# Patient Record
Sex: MALE | URBAN - METROPOLITAN AREA
[De-identification: names, ages, dates, MRNs, and addresses within clinical notes are randomized per-mention and may not be internally consistent; named-entity substitution may affect disease eponyms.]

---

## 2017-01-31 ENCOUNTER — THERAPY VISIT (OUTPATIENT)
Dept: PHYSICAL THERAPY | Facility: CLINIC | Age: 62
End: 2017-01-31
Payer: COMMERCIAL

## 2017-01-31 DIAGNOSIS — M25.561 ACUTE PAIN OF RIGHT KNEE: Primary | ICD-10-CM

## 2017-01-31 PROCEDURE — 97110 THERAPEUTIC EXERCISES: CPT | Mod: GP | Performed by: PHYSICAL THERAPIST

## 2017-01-31 PROCEDURE — 97161 PT EVAL LOW COMPLEX 20 MIN: CPT | Mod: GP | Performed by: PHYSICAL THERAPIST

## 2017-01-31 ASSESSMENT — ACTIVITIES OF DAILY LIVING (ADL)
HOW_WOULD_YOU_RATE_THE_OVERALL_FUNCTION_OF_YOUR_KNEE_DURING_YOUR_USUAL_DAILY_ACTIVITIES?: NEARLY NORMAL
STIFFNESS: THE SYMPTOM AFFECTS MY ACTIVITY MODERATELY
WEAKNESS: THE SYMPTOM AFFECTS MY ACTIVITY MODERATELY
GIVING WAY, BUCKLING OR SHIFTING OF KNEE: THE SYMPTOM AFFECTS MY ACTIVITY SLIGHTLY
GO DOWN STAIRS: ACTIVITY IS NOT DIFFICULT
SIT WITH YOUR KNEE BENT: ACTIVITY IS MINIMALLY DIFFICULT
SQUAT: ACTIVITY IS NOT DIFFICULT
RAW_SCORE: 51
WALK: ACTIVITY IS NOT DIFFICULT
GO UP STAIRS: ACTIVITY IS NOT DIFFICULT
SWELLING: I HAVE THE SYMPTOM BUT IT DOES NOT AFFECT MY ACTIVITY
STAND: ACTIVITY IS MINIMALLY DIFFICULT
KNEE_ACTIVITY_OF_DAILY_LIVING_SCORE: 72.86
AS_A_RESULT_OF_YOUR_KNEE_INJURY,_HOW_WOULD_YOU_RATE_YOUR_CURRENT_LEVEL_OF_DAILY_ACTIVITY?: NEARLY NORMAL
LIMPING: THE SYMPTOM AFFECTS MY ACTIVITY MODERATELY
PAIN: THE SYMPTOM AFFECTS MY ACTIVITY MODERATELY
RISE FROM A CHAIR: ACTIVITY IS NOT DIFFICULT
KNEE_ACTIVITY_OF_DAILY_LIVING_SUM: 51
KNEEL ON THE FRONT OF YOUR KNEE: ACTIVITY IS SOMEWHAT DIFFICULT

## 2017-01-31 NOTE — PROGRESS NOTES
[facility] Initial Evaluation    Subjective:    Forest Kapadia is a 61 year old male with a right knee condition.  Condition occurred with:  Insidious onset.  Condition occurred: for unknown reasons.  This is a new condition  Patient notes R knee pain since about 1/10/17.  He denies specific cause.  Had MRI that revealed PF arthritis.  He was referred to PT for this..    Site of Pain: anterior joint line (tibio-femoral)    Pain is described as aching and is intermittent and reported as 7/10.  Associated symptoms:  Loss of strength, buckling/giving out, edema and loss of motion/stiffness. Worse during: right away in morning, then with use/weight bearing.  Symptoms are exacerbated by walking, ascending stairs, descending stairs, kneeling and sitting and relieved by NSAID's.  Since onset symptoms are gradually improving.  Special tests:  MRI.  Previous treatment: none.    General health as reported by patient is excellent.  Pertinent medical history includes:  None.  Medical allergies: no.  Other surgeries include:  None reported.  Current medications:  Anti-inflammatory.  Current occupation is None.        Barriers include:  None as reported by the patient.    Red flags:  None as reported by the patient.                      Objective:  KNEE:    PROM:   L  R   Hyperextension 7 6   Extension 0 0   Flexion 126 124 end range pain (posterior)     Strength:   L R   HIP     Flex 5 5   Ext 4+ 4+   Abd 4+ 4+   KNEE     Flex 5 5   Ext 5 5       Special tests:   L R   Anterior Drawer  negative   Posterior Drawer  negative   Lachman's  negative   Valgus 0 degrees  negative   Valgus 30 degrees  negative   Varus 0 degrees  negative   Varus 30 degrees  negative   Ambrosio's  negative   Appley's  negative   Lateral Compression     Patellar Compression         Palpation: minimal edema noted anterior joint line area    Patellar tracking: mild lateral glide/tilt, but fairly descend position and mobility    Gait:  mild antalgic limp noted    System    Physical Exam    General     ROS    Assessment/Plan:      Patient is a 61 year old male with right side knee complaints.    Patient has the following significant findings with corresponding treatment plan.                Diagnosis 1:  R knee pain   (MRI with PF arthritis, on exam, swelling in tibiofemoral joint area, tenderness medial hamstring at musculotendinous junction and posterior/medial pain at end range knee flexion)    Pain -  hot/cold therapy  Decreased ROM/flexibility - manual therapy and therapeutic exercise  Decreased strength - therapeutic exercise and therapeutic activities  Impaired balance - neuro re-education and therapeutic activities  Decreased proprioception - neuro re-education and therapeutic activities  Edema - cold therapy  Impaired gait - gait training  Decreased function - therapeutic activities    Therapy Evaluation Codes:   1) History comprised of:   Personal factors that impact the plan of care:      None.    Comorbidity factors that impact the plan of care are:      None.     Medications impacting care: Anti-inflammatory.  2) Examination of Body Systems comprised of:   Body structures and functions that impact the plan of care:      Knee.   Activity limitations that impact the plan of care are:      Sitting, Squatting/kneeling, Stairs, Standing and Walking.  3) Clinical presentation characteristics are:   Stable/Uncomplicated.  4) Decision-Making    Low complexity using standardized patient assessment instrument and/or measureable assessment of functional outcome.  Cumulative Therapy Evaluation is: Low complexity.    Previous and current functional limitations:  (See Goal Flow Sheet for this information)    Short term and Long term goals: (See Goal Flow Sheet for this information)     Communication ability:  Patient appears to be able to clearly communicate and understand verbal and written communication and follow directions correctly.  Treatment  Explanation - The following has been discussed with the patient:   RX ordered/plan of care  Anticipated outcomes  Possible risks and side effects  This patient would benefit from PT intervention to resume normal activities.   Rehab potential is good.    Frequency:  2 X week, once daily  Duration:  for 1 weeks tapering to 1 X a week over 4 weeks  Discharge Plan:  Achieve all LTG.  Independent in home treatment program.  Reach maximal therapeutic benefit.    Please refer to the daily flowsheet for treatment today, total treatment time and time spent performing 1:1 timed codes.

## 2017-01-31 NOTE — Clinical Note
[facility] PHYSICAL THERAPY  [address]  [place] [address]  [phone]    2017    Re: Forest Kapadia   :   1955  MRN:  [id number]   [facility]ING PHYSICIAN:   [facility]    Date of Initial Evaluation:  2017  Visits:  Rxs Used: 1  Reason for Referral:  Acute pain of right knee    EVALUATION SUMMARY    Subjective:  Forest Kapadia is a 61 year old male with a right knee condition.  Condition occurred with:  Insidious onset.  Condition occurred: for unknown reasons.  This is a new condition  Patient notes R knee pain since about 1/10/17.  He denies specific cause.  Had MRI that revealed PF arthritis.  He was referred to PT for this..    Site of Pain: anterior joint line (tibio-femoral)    Pain is described as aching and is intermittent and reported as 7/10.  Associated symptoms:  Loss of strength, buckling/giving out, edema and loss of motion/stiffness. Worse during: right away in morning, then with use/weight bearing.  Symptoms are exacerbated by walking, ascending stairs, descending stairs, kneeling and sitting and relieved by NSAID's.  Since onset symptoms are gradually improving.  Special tests:  MRI.  Previous treatment: none.    General health as reported by patient is excellent.  Pertinent medical history includes:  None.  Medical allergies: no.  Other surgeries include:  None reported.  Current medications:  Anti-inflammatory.  Current occupation is None.      Barriers include:  None as reported by the patient.  Red flags:  None as reported by the patient.                 Objective:    KNEE:  PROM:   L  R   Hyperextension 7 6   Extension 0 0   Flexion 126 124 end range pain (posterior)     Strength:   L R   HIP     Flex 5 5   Ext 4+ 4+   Abd 4+ 4+   KNEE     Flex 5 5   Ext 5 5     Special tests:   L R   Anterior Drawer  negative    Posterior Drawer  negative   Lachman's  negative   Valgus 0 degrees  negative   Valgus 30 degrees  negative   Varus 0 degrees  negative   Varus 30 degrees  negative   Ambrosio's  negative   Appley's  negative   Lateral Compression     Patellar Compression       Palpation: minimal edema noted anterior joint line area  Patellar tracking: mild lateral glide/tilt, but fairly descend position and mobility  Gait: mild antalgic limp noted    Assessment/Plan:    Patient is a 61 year old male with right side knee complaints.    Patient has the following significant findings with corresponding treatment plan.                Diagnosis 1:  R knee pain   (MRI with PF arthritis, on exam, swelling in tibiofemoral joint area, tenderness medial hamstring at musculotendinous junction and posterior/medial pain at end range knee flexion)  Pain -  hot/cold therapy  Decreased ROM/flexibility - manual therapy and therapeutic exercise  Decreased strength - therapeutic exercise and therapeutic activities  Impaired balance - neuro re-education and therapeutic activities  Decreased proprioception - neuro re-education and therapeutic activities  Edema - cold therapy  Impaired gait - gait training  Decreased function - therapeutic activities    Therapy Evaluation Codes:   1) History comprised of:   Personal factors that impact the plan of care:      None.    Comorbidity factors that impact the plan of care are:      None.     Medications impacting care: Anti-inflammatory.  2) Examination of Body Systems comprised of:  Re: Forest Kapadia   :   1955     Body structures and functions that impact the plan of care:      Knee.   Activity limitations that impact the plan of care are:      Sitting, Squatting/kneeling, Stairs, Standing and Walking.  3) Clinical presentation characteristics are:   Stable/Uncomplicated.  4) Decision-Making    Low complexity using standardized patient assessment instrument and/or measureable assessment of  functional outcome.  Cumulative Therapy Evaluation is: Low complexity.    Previous and current functional limitations:  (See Goal Flow Sheet for this information)    Short term and Long term goals: (See Goal Flow Sheet for this information)     Communication ability:  Patient appears to be able to clearly communicate and understand verbal and written communication and follow directions correctly.  Treatment Explanation - The following has been discussed with the patient:   RX ordered/plan of care  Anticipated outcomes  Possible risks and side effects  This patient would benefit from PT intervention to resume normal activities.   Rehab potential is good.    Frequency:  2 X week, once daily  Duration:  for 1 weeks tapering to 1 X a week over 4 weeks  Discharge Plan:  Achieve all LTG.  Independent in home treatment program.  Reach maximal therapeutic benefit.     Thank you for your referral.    INQUIRIES  Therapist: Serge Odonnell DPT  [facility]  [address] [address]  Phone: [phone]  Fax: [phone]

## 2017-02-02 ENCOUNTER — THERAPY VISIT (OUTPATIENT)
Dept: PHYSICAL THERAPY | Facility: CLINIC | Age: 62
End: 2017-02-02

## 2017-02-02 DIAGNOSIS — M25.561 ACUTE PAIN OF RIGHT KNEE: Primary | ICD-10-CM

## 2017-02-02 PROCEDURE — 97110 THERAPEUTIC EXERCISES: CPT | Mod: GP | Performed by: PHYSICAL THERAPIST

## 2017-02-02 PROCEDURE — 97014 ELECTRIC STIMULATION THERAPY: CPT | Mod: GP | Performed by: PHYSICAL THERAPIST

## 2017-05-01 PROBLEM — M25.561 ACUTE PAIN OF RIGHT KNEE: Status: RESOLVED | Noted: 2017-01-31 | Resolved: 2017-05-01

## 2017-05-01 NOTE — PROGRESS NOTES
Subjective:    HPI                    Objective:    System    Physical Exam    General     ROS    Assessment/Plan:      DISCHARGE REPORT    Progress reporting period is from 1/31/17 to 2/2/17 (2 visits).       SUBJECTIVE  Subjective changes noted by patient:   Patient reports feeling well since last time, still some increased pain with walking when bearing weight on R leg. Says HEP going well, notes pain with hamstring stretch in posterior R knee/ distal hamstring tendon area. Says that pain feels more dull than last visit.    Current pain level is: 5/10.     Previous pain level was: 7/10.   Changes in function:  Yes (See Goal flowsheet attached for changes in current functional level)  Adverse reaction to treatment or activity: None    OBJECTIVE  Changes noted in objective findings:  Yes,   Objective: Minimal edema around R knee joint line, slightly improved since last visit. Noted some fatigue with biking 3 min, seated knee ext. AROM with DF pt was able to perform 8 out of 10 reps before fatigue. Also noted some trunk sidebending with side stepping exercise, corrections for smaller steps, more core control.     ASSESSMENT/PLAN  Updated problem list and treatment plan: Diagnosis 1:  R knee pain (MRI with PF pain, but exam with edema tibiofemoral joint, tender posteromedial HS musculotendinous junction, pain with end range flexion)    Progress toward STG/LTGs have been made:  See Goal flow sheet completed today.  Assessment of Progress: Patient did not return to therapy. Current status is unknown.  Self Management Plans:  Patient has been instructed in a home treatment program.  Patient continues to require the following intervention to meet STG and LT's:  Patient did not return to therapy.  PT services will be discontinued.     Recommendations:  Patient will be discharged from physical therapy.         Please refer to the daily flowsheet for treatment today, total treatment time and time spent performing 1:1 timed  codes.

## 2019-08-07 ENCOUNTER — THERAPY VISIT (OUTPATIENT)
Dept: PHYSICAL THERAPY | Facility: CLINIC | Age: 64
End: 2019-08-07
Payer: COMMERCIAL

## 2019-08-07 DIAGNOSIS — M25.562 ACUTE PAIN OF LEFT KNEE: ICD-10-CM

## 2019-08-07 PROCEDURE — 97110 THERAPEUTIC EXERCISES: CPT | Mod: GP | Performed by: PHYSICAL THERAPIST

## 2019-08-07 PROCEDURE — 97161 PT EVAL LOW COMPLEX 20 MIN: CPT | Mod: GP | Performed by: PHYSICAL THERAPIST

## 2019-08-07 PROCEDURE — 97140 MANUAL THERAPY 1/> REGIONS: CPT | Mod: GP | Performed by: PHYSICAL THERAPIST

## 2019-08-07 ASSESSMENT — ACTIVITIES OF DAILY LIVING (ADL)
LIMPING: I HAVE THE SYMPTOM BUT IT DOES NOT AFFECT MY ACTIVITY
WALK: ACTIVITY IS NOT DIFFICULT
STAND: ACTIVITY IS NOT DIFFICULT
RAW_SCORE: 65
SWELLING: I HAVE THE SYMPTOM BUT IT DOES NOT AFFECT MY ACTIVITY
SQUAT: ACTIVITY IS NOT DIFFICULT
KNEEL ON THE FRONT OF YOUR KNEE: ACTIVITY IS NOT DIFFICULT
SIT WITH YOUR KNEE BENT: ACTIVITY IS NOT DIFFICULT
PAIN: I HAVE THE SYMPTOM BUT IT DOES NOT AFFECT MY ACTIVITY
WEAKNESS: I DO NOT HAVE THE SYMPTOM
GO DOWN STAIRS: ACTIVITY IS NOT DIFFICULT
HOW_WOULD_YOU_RATE_THE_OVERALL_FUNCTION_OF_YOUR_KNEE_DURING_YOUR_USUAL_DAILY_ACTIVITIES?: NORMAL
GIVING WAY, BUCKLING OR SHIFTING OF KNEE: I HAVE THE SYMPTOM BUT IT DOES NOT AFFECT MY ACTIVITY
GO UP STAIRS: ACTIVITY IS NOT DIFFICULT
HOW_WOULD_YOU_RATE_THE_CURRENT_FUNCTION_OF_YOUR_KNEE_DURING_YOUR_USUAL_DAILY_ACTIVITIES_ON_A_SCALE_FROM_0_TO_100_WITH_100_BEING_YOUR_LEVEL_OF_KNEE_FUNCTION_PRIOR_TO_YOUR_INJURY_AND_0_BEING_THE_INABILITY_TO_PERFORM_ANY_OF_YOUR_USUAL_DAILY_ACTIVITIES?: 90
RISE FROM A CHAIR: ACTIVITY IS NOT DIFFICULT
AS_A_RESULT_OF_YOUR_KNEE_INJURY,_HOW_WOULD_YOU_RATE_YOUR_CURRENT_LEVEL_OF_DAILY_ACTIVITY?: NORMAL
STIFFNESS: I HAVE THE SYMPTOM BUT IT DOES NOT AFFECT MY ACTIVITY
KNEE_ACTIVITY_OF_DAILY_LIVING_SCORE: 92.86
KNEE_ACTIVITY_OF_DAILY_LIVING_SUM: 65

## 2019-08-07 NOTE — LETTER
[facility] ATHLETIC [facility] PHYSICAL THERAPY  [address]  [place] [address]  [phone]    2019    Re: Forest Kapadia   :   1955  MRN:  [id number]   REFERRING PHYSICIAN:   Tami Mei    [facility]    Date of Initial Evaluation:  2019  Visits:  Rxs Used: 1  Reason for Referral:  Acute pain of left knee    EVALUATION SUMMARY    Subjective:  The history is provided by the patient. No  was used.   Type of problem:  Left knee  Condition occurred with:  Insidious onset. This is a new condition   Problem details: I woke up with the pain 2019 morning.  I was standing and walking on , but nothing unusual..   Patient reports pain:  Anterior, lateral and medial. Radiates to:  Lower leg. Associated with: none. Symptoms are exacerbated by descending stairs, kneeling, weight bearing and ascending stairs (trying to bend the knee) and relieved by NSAID's.  Forest Kapadia being seen for left knee pain.   Problem began 2019. Where condition occurred: for unknown reasons.Problem occurred: unknown  and reported as 7/10 (lowest has been a 2.) on pain scale. General health as reported by patient is excellent.  Other medical history details: right knee pain.  Surgeries include:  None.  Current medications:  Pain medication.   Primary job tasks include:  Prolonged standing.  Pain is described as sharp and is constant. Pain is worse in the P.M.. Since onset symptoms are gradually improving. Special tests:  X-ray (unknown). Previous treatment includes physical therapy (right knee pain ). There was moderate improvement following previous treatment.   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  Barriers include:  Bathroom/bedroom on second floor (three level house).  Red flags:  None as reported by patient.                   Objective:  Standing Alignment:    Cervical/Thoracic:  Forward head (poor sitting posture)  Lumbar:  Lordosis decr  Gait:    Deviations:  Knee:  Knee extension decr LGeneral Deviations:  Stance time decr and araceli decr  Non-Weight Bearing:    Knee:  Patellar lateral tilt L  Flexibility/Screens:   Positive screens:  Knee  Re: Forest Kapadia   :   1955    Lower Extremity:  Decreased left lower extremity flexibility:IT Band; Quadriceps; Hamstrings and Gastroc  Decreased right lower extremity flexibility:  IT Band; Quadriceps; Hamstrings and Gastroc       Knee Evaluation:  ROM:    AROM  Extension:  Left: 10    Right:  8  Flexion: Left: 110    Right: 120  PROM  Hyperextension: Left: 6    Right:  3  Extension: Left: 0    Right:  0  Flexion: Left: 120    Right:  122  Endfeel: hip abduction strength right 3+/5, left 3/5, no hip extension passive bilateral   Strength:   Extension:  Left: 5/5   Pain:      Right: 5/5   Pain:  Flexion:  Left: 5/5   Pain:      Right: 5/5   Pain:    Quad Set Left: Fair    Pain:   Quad Set Right: Good    Pain:  Ligament Testing:    Varus 0:  Left:  Neg     Valgus 0:  Left:  Neg    Anterior Drawer:  Left:  Neg      Special Tests:   Left knee positive for the following special tests:  Patellar Tracking-Abduction Lateral  Palpation:    Left knee tenderness present at:  Lateral Joint Line; IT Band and Patellar Lateral  Mobility Testing:    Patellofemoral Medial:  Left: hypomobile      Patellofemoral Lateral:  Left: normal      Patellofemoral Superior:  Left: normal      Patellofemoral Inferior:  Left: normal      Functional Testing:    Quad:    Single Leg Squat:  Left:      Right:        Bilateral Leg Squat:   Moderate loss of control    Proprioception:   Stork Balance Test:  Left:  7  Right:  WFL  % of Uninvolved:     Assessment/Plan:    Patient is a 63 year old male with left side knee complaints.    Patient has the following significant findings with corresponding  treatment plan.                Diagnosis 1:  Left knee pain   Pain -  manual therapy, self management, education and home program  Decreased ROM/flexibility - manual therapy, therapeutic exercise, therapeutic activity and home program  Decreased joint mobility - manual therapy, therapeutic exercise, therapeutic activity and home program  Re: Forest Kapadia   :   1955    Decreased strength - therapeutic exercise, therapeutic activities and home program  Impaired balance - neuro re-education, gait training, therapeutic activities and home program  Impaired gait - gait training and home program  Impaired muscle performance - neuro re-education and home program  Decreased function - therapeutic activities and home program  Impaired posture - neuro re-education, therapeutic activities and home program    Therapy Evaluation Codes:   Cumulative Therapy Evaluation is: Low complexity.    Previous and current functional limitations:  (See Goal Flow Sheet for this information)    Short term and Long term goals: (See Goal Flow Sheet for this information)     Communication ability:  Patient appears to be able to clearly communicate and understand verbal and written communication and follow directions correctly.  Treatment Explanation - The following has been discussed with the patient:   RX ordered/plan of care  This patient would benefit from PT intervention to resume normal activities.   Rehab potential is good.    Frequency:  1 X week, once daily  Duration:  for 6 weeks  Discharge Plan:  Achieve all LTG.  Independent in home treatment program.    Thank you for your referral.    INQUIRIES  Therapist:Juliette De La Rosa, PT  [facility]  [address] [address]  Phone: [phone]  Fax: [phone]

## 2019-08-07 NOTE — PROGRESS NOTES
[facility] Initial Evaluation  Subjective:  The history is provided by the patient. No  was used.   Type of problem:  Left knee   Condition occurred with:  Insidious onset. This is a new condition   Problem details: I woke up with the pain Monday 8/5/2019 morning.  I was standing and walking on Sunday, but nothing unusual..   Patient reports pain:  Anterior, lateral and medial. Radiates to:  Lower leg. Associated with: none. Symptoms are exacerbated by descending stairs, kneeling, weight bearing and ascending stairs (trying to bend the knee) and relieved by NSAID's.    Forest Kapadia being seen for left knee pain.   Problem began 8/5/2019. Where condition occurred: for unknown reasons.Problem occurred: unknown  and reported as 7/10 (lowest has been a 2.) on pain scale. General health as reported by patient is excellent.  Other medical history details: right knee pain.    Surgeries include:  None.  Current medications:  Pain medication.   Primary job tasks include:  Prolonged standing.  Pain is described as sharp and is constant. Pain is worse in the P.M.. Since onset symptoms are gradually improving. Special tests:  X-ray (unknown). Previous treatment includes physical therapy (right knee pain ). There was moderate improvement following previous treatment.   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    Barriers include:  Bathroom/bedroom on second floor (three level house).  Red flags:  None as reported by patient.                      Objective:  Standing Alignment:    Cervical/Thoracic:  Forward head (poor sitting posture)    Lumbar:  Lordosis decr            Gait:      Deviations:  Knee:  Knee extension decr LGeneral Deviations:  Stance time decr and araceli decr  Non-Weight Bearing:        Knee:  Patellar lateral tilt L    Flexibility/Screens:   Positive screens:  Knee    Lower Extremity:  Decreased left lower  extremity flexibility:IT Band; Quadriceps; Hamstrings and Gastroc    Decreased right lower extremity flexibility:  IT Band; Quadriceps; Hamstrings and Gastroc                                                      Knee Evaluation:  ROM:    AROM      Extension:  Left: 10    Right:  8  Flexion: Left: 110    Right: 120  PROM    Hyperextension: Left: 6    Right:  3  Extension: Left: 0    Right:  0  Flexion: Left: 120    Right:  122    Endfeel: hip abduction strength right 3+/5, left 3/5, no hip extension passive bilateral   Strength:     Extension:  Left: 5/5   Pain:      Right: 5/5   Pain:  Flexion:  Left: 5/5   Pain:      Right: 5/5   Pain:    Quad Set Left: Fair    Pain:   Quad Set Right: Good    Pain:  Ligament Testing:    Varus 0:  Left:  Neg       Valgus 0:  Left:  Neg      Anterior Drawer:  Left:  Neg          Special Tests:   Left knee positive for the following special tests:  Patellar Tracking-Abduction Lateral    Palpation:    Left knee tenderness present at:  Lateral Joint Line; IT Band and Patellar Lateral      Mobility Testing:      Patellofemoral Medial:  Left: hypomobile      Patellofemoral Lateral:  Left: normal      Patellofemoral Superior:  Left: normal      Patellofemoral Inferior:  Left: normal      Functional Testing:          Quad:    Single Leg Squat:  Left:      Right:        Bilateral Leg Squat:   Moderate loss of control      Proprioception:   Stork Balance Test:  Left:  7  Right:  WFL  % of Uninvolved:           General     ROS    Assessment/Plan:    Patient is a 63 year old male with left side knee complaints.    Patient has the following significant findings with corresponding treatment plan.                Diagnosis 1:  Left knee pain   Pain -  manual therapy, self management, education and home program  Decreased ROM/flexibility - manual therapy, therapeutic exercise, therapeutic activity and home program  Decreased joint mobility - manual therapy, therapeutic exercise, therapeutic activity  and home program  Decreased strength - therapeutic exercise, therapeutic activities and home program  Impaired balance - neuro re-education, gait training, therapeutic activities and home program  Impaired gait - gait training and home program  Impaired muscle performance - neuro re-education and home program  Decreased function - therapeutic activities and home program  Impaired posture - neuro re-education, therapeutic activities and home program    Therapy Evaluation Codes:   Cumulative Therapy Evaluation is: Low complexity.    Previous and current functional limitations:  (See Goal Flow Sheet for this information)    Short term and Long term goals: (See Goal Flow Sheet for this information)     Communication ability:  Patient appears to be able to clearly communicate and understand verbal and written communication and follow directions correctly.  Treatment Explanation - The following has been discussed with the patient:   RX ordered/plan of care  This patient would benefit from PT intervention to resume normal activities.   Rehab potential is good.    Frequency:  1 X week, once daily  Duration:  for 6 weeks  Discharge Plan:  Achieve all LTG.  Independent in home treatment program.    Please refer to the daily flowsheet for treatment today, total treatment time and time spent performing 1:1 timed codes.

## 2020-07-02 PROBLEM — M25.562 ACUTE PAIN OF LEFT KNEE: Status: RESOLVED | Noted: 2019-08-07 | Resolved: 2020-07-02

## 2025-01-22 ENCOUNTER — TRANSFERRED RECORDS (OUTPATIENT)
Dept: HEALTH INFORMATION MANAGEMENT | Facility: CLINIC | Age: 70
End: 2025-01-22

## 2025-01-23 ENCOUNTER — TRANSCRIBE ORDERS (OUTPATIENT)
Dept: OTHER | Age: 70
End: 2025-01-23

## 2025-01-23 DIAGNOSIS — H40.1193 PRIMARY OPEN-ANGLE GLAUCOMA (POAG), SEVERE STAGE: ICD-10-CM

## 2025-01-23 DIAGNOSIS — H46.9 OPTIC NEUROPATHY: Primary | ICD-10-CM
